# Patient Record
Sex: FEMALE
[De-identification: names, ages, dates, MRNs, and addresses within clinical notes are randomized per-mention and may not be internally consistent; named-entity substitution may affect disease eponyms.]

---

## 2021-09-10 ENCOUNTER — TRANSCRIPTION ENCOUNTER (OUTPATIENT)
Age: 31
End: 2021-09-10

## 2021-10-20 ENCOUNTER — TRANSCRIPTION ENCOUNTER (OUTPATIENT)
Age: 31
End: 2021-10-20

## 2023-02-19 ENCOUNTER — EMERGENCY (EMERGENCY)
Facility: HOSPITAL | Age: 33
LOS: 0 days | Discharge: HOME | End: 2023-02-19
Payer: COMMERCIAL

## 2023-02-19 VITALS
TEMPERATURE: 98 F | RESPIRATION RATE: 19 BRPM | HEART RATE: 78 BPM | WEIGHT: 179.9 LBS | SYSTOLIC BLOOD PRESSURE: 105 MMHG | DIASTOLIC BLOOD PRESSURE: 70 MMHG | OXYGEN SATURATION: 96 % | HEIGHT: 64 IN

## 2023-02-19 VITALS
DIASTOLIC BLOOD PRESSURE: 76 MMHG | HEART RATE: 82 BPM | TEMPERATURE: 98 F | RESPIRATION RATE: 20 BRPM | OXYGEN SATURATION: 98 % | SYSTOLIC BLOOD PRESSURE: 110 MMHG

## 2023-02-19 DIAGNOSIS — M54.2 CERVICALGIA: ICD-10-CM

## 2023-02-19 PROCEDURE — 99284 EMERGENCY DEPT VISIT MOD MDM: CPT

## 2023-02-19 RX ORDER — METHOCARBAMOL 500 MG/1
2 TABLET, FILM COATED ORAL
Qty: 30 | Refills: 0
Start: 2023-02-19 | End: 2023-02-23

## 2023-02-19 RX ORDER — KETOROLAC TROMETHAMINE 30 MG/ML
15 SYRINGE (ML) INJECTION ONCE
Refills: 0 | Status: DISCONTINUED | OUTPATIENT
Start: 2023-02-19 | End: 2023-02-19

## 2023-02-19 RX ORDER — DIAZEPAM 5 MG
5 TABLET ORAL ONCE
Refills: 0 | Status: DISCONTINUED | OUTPATIENT
Start: 2023-02-19 | End: 2023-02-19

## 2023-02-19 RX ORDER — DEXAMETHASONE 0.5 MG/5ML
10 ELIXIR ORAL ONCE
Refills: 0 | Status: COMPLETED | OUTPATIENT
Start: 2023-02-19 | End: 2023-02-19

## 2023-02-19 RX ORDER — IBUPROFEN 200 MG
1 TABLET ORAL
Qty: 20 | Refills: 0
Start: 2023-02-19 | End: 2023-02-23

## 2023-02-19 RX ORDER — ACETAMINOPHEN 500 MG
975 TABLET ORAL ONCE
Refills: 0 | Status: COMPLETED | OUTPATIENT
Start: 2023-02-19 | End: 2023-02-19

## 2023-02-19 RX ADMIN — Medication 10 MILLIGRAM(S): at 20:31

## 2023-02-19 RX ADMIN — Medication 15 MILLIGRAM(S): at 20:16

## 2023-02-19 RX ADMIN — Medication 15 MILLIGRAM(S): at 19:46

## 2023-02-19 RX ADMIN — Medication 975 MILLIGRAM(S): at 20:30

## 2023-02-19 RX ADMIN — Medication 5 MILLIGRAM(S): at 19:47

## 2023-02-19 NOTE — ED PROVIDER NOTE - PROVIDER TOKENS
FREE:[LAST:[your pmd in 1-3 days],PHONE:[(   )    -],FAX:[(   )    -]],PROVIDER:[TOKEN:[[id number]],FOLLOWUP:[1-3 Days]]

## 2023-02-19 NOTE — ED PROVIDER NOTE - PHYSICAL EXAMINATION
PHYSICAL EXAM:    GENERAL: Alert, appears stated age, well appearing, non-toxic  SKIN: Warm, pink and dry. MMM.   HEAD: NC, AT  EYE: Normal lids/conjunctiva  ENT: Normal hearing, patent oropharynx without erythema or exudate  NECK: +supple. No meningismus, or JVD, +Trachea midline. no midline tenderness/step offs. +R>L paracervical ttp. no rash.   Pulm: Bilateral BS, normal resp effort, no wheezes, stridor, or retractions  CV: RRR, no M/R/G, 2+and = radial pulses  Mskel: no erythema, cyanosis, edema. no calf tenderness. no spinal ttp.   Neuro: AAOx3, no sensory/motor deficits, 5/5 strength throughout. normal gait.

## 2023-02-19 NOTE — ED ADULT TRIAGE NOTE - CHIEF COMPLAINT QUOTE
Patient c/o an aching dull neck pain x 10 days. Denies any trauma or injury. Patient used Bengay with no relief  LMP 2/2/23  no pmh

## 2023-02-19 NOTE — ED ADULT NURSE NOTE - CHPI ED NUR SYMPTOMS NEG
no anorexia/no bladder dysfunction/no bowel dysfunction/no constipation/no difficulty bearing weight/no fatigue/no motor function loss

## 2023-02-19 NOTE — ED ADULT NURSE NOTE - OBJECTIVE STATEMENT
Pt aaox4, presents to the ed c/o 10/10 nontraumatic aching neck and bilateral shoulder pain x10 days. Pt is tearful during assessment and states she's been using bengay cream with minimal relief; she's been unable to sleep 2/2 pain the past few nights. Pt denies fever/chills, headache, blurry vision, dizziness, numbness/tingling, rash, bowel or bladder incontinence, cp, sob, weakness. Pt denies any known sick contacts. Pt denies any PMHx/PSHx. LMP 2/2/23. Respirations even and unlabored.

## 2023-02-19 NOTE — ED PROVIDER NOTE - PATIENT PORTAL LINK FT
You can access the FollowMyHealth Patient Portal offered by [facility] by registering at the following website: [email or web address]. By joining [facility] FollowMyHealth portal, you will also be able to view your health information using other applications (apps) compatible with our system.

## 2023-02-19 NOTE — ED PROVIDER NOTE - NSFOLLOWUPINSTRUCTIONS_ED_ALL_ED_FT
Cervical Strain and Sprain Rehab      Ask your health care provider which exercises are safe for you. Do exercises exactly as told by your health care provider and adjust them as directed. It is normal to feel mild stretching, pulling, tightness, or discomfort as you do these exercises. Stop right away if you feel sudden pain or your pain gets worse. Do not begin these exercises until told by your health care provider.      Stretching and range-of-motion exercises      Cervical side bending   A person sitting in a chair. Looking straight ahead, the person tilts an ear toward a shoulder until a stretch is felt.   1.Using good posture, sit on a stable chair or stand up.      2.Without moving your shoulders, slowly tilt your left / right ear to your shoulder until you feel a stretch in the opposite side neck muscles. You should be looking straight ahead.      3.Hold for __________ seconds.      4.Repeat with the other side of your neck.      Repeat __________ times. Complete this exercise __________ times a day.      Cervical rotation   A person sitting on a chair. The person turns head to one side, returns to center, and repeats on other side.   1.Using good posture, sit on a stable chair or stand up.    2.Slowly turn your head to the side as if you are looking over your left / right shoulder.  •Keep your eyes level with the ground.      •Stop when you feel a stretch along the side and the back of your neck.        3.Hold for __________ seconds.      4.Repeat this by turning to your other side.      Repeat __________ times. Complete this exercise __________ times a day.      Thoracic extension and pectoral stretch   A person lying with a rolled-up towel under the middle back. Person puts hands behind head and lets elbows fall out to sides.   1.Roll a towel or a small blanket so it is about 4 inches (10 cm) in diameter.      2.Lie down on your back on a firm surface.      3.Put the towel in the middle of your back across your spine. It should not be under your shoulder blades.      4.Put your hands behind your head and let your elbows fall out to your sides.      5.Hold for __________ seconds.      Repeat __________ times. Complete this exercise __________ times a day.      Strengthening exercises      Isometric upper cervical flexion   A person lying face-up, a pillow behind the head and a rolled-up towel under the neck. The person tucks chin toward chest.   1.Lie on your back with a thin pillow behind your head and a small rolled-up towel under your neck.      2.Gently tuck your chin toward your chest and nod your head down to look toward your feet. Do not lift your head off the pillow.      3.Hold for __________ seconds.      4.Release the tension slowly. Relax your neck muscles completely before you repeat this exercise.      Repeat __________ times. Complete this exercise __________ times a day.      Isometric cervical extension   A person standing with a ball between the back of the head and a wall. The person tilts head back and presses into ball.   1.Stand about 6 inches (15 cm) away from a wall, with your back facing the wall.      2.Place a soft object, about 6–8 inches (15–20 cm) in diameter, between the back of your head and the wall. A soft object could be a small pillow, a ball, or a folded towel.      3.Gently tilt your head back and press into the soft object. Keep your jaw and forehead relaxed.      4.Hold for __________ seconds.      5.Release the tension slowly. Relax your neck muscles completely before you repeat this exercise.      Repeat __________ times. Complete this exercise __________ times a day.      Posture and body mechanics    Body mechanics refers to the movements and positions of your body while you do your daily activities. Posture is part of body mechanics. Good posture and healthy body mechanics can help to relieve stress in your body's tissues and joints. Good posture means that your spine is in its natural S-curve position (your spine is neutral), your shoulders are pulled back slightly, and your head is not tipped forward. The following are general guidelines for applying improved posture and body mechanics to your everyday activities.      Sitting   A person showing good posture while sitting at a desk and using a computer.   1.When sitting, keep your spine neutral and keep your feet flat on the floor. Use a footrest, if necessary, and keep your thighs parallel to the floor. Avoid rounding your shoulders, and avoid tilting your head forward.      2.When working at a desk or a computer, keep your desk at a height where your hands are slightly lower than your elbows. Slide your chair under your desk so you are close enough to maintain good posture.      3.When working at a computer, place your monitor at a height where you are looking straight ahead and you do not have to tilt your head forward or downward to look at the screen.        Standing   A person standing and ironing with one foot resting on a stable footstool to help keep the spine neutral.   •When standing, keep your spine neutral and keep your feet about hip-width apart. Keep a slight bend in your knees. Your ears, shoulders, and hips should line up.      •When you do a task in which you  one place for a long time, place one foot up on a stable object that is 2–4 inches (5–10 cm) high, such as a footstool. This helps keep your spine neutral.      Resting     A person lying on his side, head and neck supported by a pillow. A dotted line runs along spine to show neutral position.When lying down and resting, avoid positions that are most painful for you. Try to support your neck in a neutral position. You can use a contour pillow or a small rolled-up towel. Your pillow should support your neck but not push on it.    This information is not intended to replace advice given to you by your health care provider. Make sure you discuss any questions you have with your health care provider.      Document Revised: 11/07/2022 Document Reviewed: 11/07/2022    Elsevier Patient Education © 2022 [employer]

## 2023-02-19 NOTE — ED PROVIDER NOTE - CARE PROVIDER_API CALL
your pmd in 1-3 days,   Phone: (   )    -  Fax: (   )    -  Follow Up Time:     Angelo Galvan (DO)  Orthopaedic Surgery Surgery  [address] [facility], [place] [address]  Phone: [phone]  Fax: [phone]  Follow Up Time: 1-3 Days

## 2023-02-19 NOTE — ED PROVIDER NOTE - OBJECTIVE STATEMENT
32-year-old female with past medical history of chronic back pain, right shoulder surgery 2018 presents with moderate constant throbbing nonradiating bilateral neck pain x 10 days, much worse with twisting head, much better with when holding head in straight position.  No known trauma, fall, nausea, vomiting, paresthesia, weakness, fever, rash.  denies difficulty ambulating, direct trauma, hx IVDA, recent injections, hx back surgeries, unexplained wt loss.

## 2023-02-19 NOTE — ED PROVIDER NOTE - CLINICAL SUMMARY MEDICAL DECISION MAKING FREE TEXT BOX
33 y/o F with atraumatic paracervical neck pain x 10 days, much worse with twisting motions, better with holding head forward.  has only tried bengay for it.   no paresthesia/weakness/change in color or pulses.   other than paracervical ttp, normal exam  has follow up with pcp.  will tx with valium (someone is driving her home) and toradol.   denies pregnancy.   Reviewed necessity for follow up. Counseled on red flags and to return for them.  Patient appears well on discharge.

## 2023-02-21 ENCOUNTER — FORM ENCOUNTER (OUTPATIENT)
Age: 33
End: 2023-02-21

## 2023-02-22 ENCOUNTER — APPOINTMENT (OUTPATIENT)
Dept: ORTHOPEDIC SURGERY | Facility: CLINIC | Age: 33
End: 2023-02-22
Payer: COMMERCIAL

## 2023-02-22 ENCOUNTER — APPOINTMENT (OUTPATIENT)
Dept: MRI IMAGING | Facility: CLINIC | Age: 33
End: 2023-02-22
Payer: COMMERCIAL

## 2023-02-22 VITALS — WEIGHT: 180 LBS | BODY MASS INDEX: 30.73 KG/M2 | HEIGHT: 64 IN

## 2023-02-22 DIAGNOSIS — M54.2 CERVICALGIA: ICD-10-CM

## 2023-02-22 DIAGNOSIS — Z78.9 OTHER SPECIFIED HEALTH STATUS: ICD-10-CM

## 2023-02-22 PROBLEM — Z00.00 ENCOUNTER FOR PREVENTIVE HEALTH EXAMINATION: Status: ACTIVE | Noted: 2023-02-22

## 2023-02-22 PROCEDURE — 72141 MRI NECK SPINE W/O DYE: CPT

## 2023-02-22 PROCEDURE — 99204 OFFICE O/P NEW MOD 45 MIN: CPT

## 2023-02-22 PROCEDURE — 72040 X-RAY EXAM NECK SPINE 2-3 VW: CPT

## 2023-02-22 NOTE — PHYSICAL EXAM
[de-identified] : 02/22/2023 rotational range to the right is 75% is full.  rotation to the left is full.  the biceps reflux 2 plus and equal the triceps are 1-2 plus equal.  Pt states that  driving  is the most panful activity.  While driving pt applies pressure with her fingertips to the mid line of the cervical spine.  X-Rays C-Spine lateral shows complete straightening of the lordosis.  There is a minimal DDD from C1 down to C6 and slight spur formation narrowing at C6-C7 no lesions are seen.  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  The view of the C1 to articulation is obscured by overlying structures.  This articulation will be evaluated by MRI.

## 2023-02-27 RX ORDER — IBUPROFEN 800 MG/1
800 TABLET, FILM COATED ORAL 3 TIMES DAILY
Qty: 60 | Refills: 0 | Status: ACTIVE | COMMUNITY
Start: 2023-02-27 | End: 1900-01-01

## 2023-03-02 ENCOUNTER — APPOINTMENT (OUTPATIENT)
Dept: ORTHOPEDIC SURGERY | Facility: CLINIC | Age: 33
End: 2023-03-02
Payer: COMMERCIAL

## 2023-03-02 VITALS — WEIGHT: 180 LBS | HEIGHT: 64 IN | BODY MASS INDEX: 30.73 KG/M2

## 2023-03-02 DIAGNOSIS — M54.2 CERVICALGIA: ICD-10-CM

## 2023-03-02 DIAGNOSIS — S13.9XXA SPRAIN OF JOINTS AND LIGAMENTS OF UNSPECIFIED PARTS OF NECK, INITIAL ENCOUNTER: ICD-10-CM

## 2023-03-02 PROCEDURE — 99214 OFFICE O/P EST MOD 30 MIN: CPT

## 2023-03-02 RX ORDER — METAXALONE 800 MG/1
800 TABLET ORAL TWICE DAILY
Qty: 60 | Refills: 0 | Status: COMPLETED | COMMUNITY
Start: 2023-03-02 | End: 2023-03-02

## 2023-03-02 RX ORDER — METHOCARBAMOL 750 MG/1
750 TABLET, FILM COATED ORAL TWICE DAILY
Qty: 40 | Refills: 1 | Status: COMPLETED | COMMUNITY
Start: 2023-02-27 | End: 2023-03-02

## 2023-03-02 RX ORDER — CYCLOBENZAPRINE HYDROCHLORIDE 5 MG/1
5 TABLET, FILM COATED ORAL 3 TIMES DAILY
Qty: 90 | Refills: 0 | Status: ACTIVE | COMMUNITY
Start: 2023-03-02 | End: 1900-01-01

## 2023-03-02 RX ORDER — METAXALONE 800 MG/1
800 TABLET ORAL TWICE DAILY
Qty: 60 | Refills: 0 | Status: COMPLETED | COMMUNITY
End: 2023-03-02

## 2023-03-02 NOTE — DISCUSSION/SUMMARY
[de-identified] : MRI of the C-spine to investigate C1-2 and the source of the radicular symptoms.  \par \par Pt will continue with activity modification and home exercise as tolerated.  The patient should avoid exercise and activity that aggravates pain. \par \par \par The patient was advised of the diagnosis.  The natural history of the pathology was explained in full to the patient in layman's terms, including but not limited to the risks, symptoms and available options for treatment.  We discussed the risks, benefits and alternatives of the treatment options and the advice I provided to the patient as listed above.  Pt was given the opportunity to ask questions, and all questions were answered.  The discussion was not limited to the above.

## 2023-03-02 NOTE — DISCUSSION/SUMMARY
[de-identified] : 1) **Rx PT \par 2) **Continue Ibuprofen 800mg 1TID PRN. The patient may combine each dose of this medication with 1 tablet of OTC Tylenol ES (500mg). \par 3) **Rx Metaxalone \par 4) D/C Methocarbamol, and do not take with Metaxalone\par 5) Pt will continue with activity modification and home exercise as tolerated.  The patient should avoid exercise and activity that aggravates pain. \par \par \par The patient will continue with conservative treatment as described above, and will F/U in 6 weeks\par \par NSAIDs- The patient was informed of the risks, benefits and alternatives of this medication, including risks to the GI tract, kidneys, liver, increased blood pressure, cardiac risk, and risk of fluid retention.  Advised to clear medication with internist or PCP if any concurrent health problem with heart, blood pressure, or GI system exists.  Do not combine NSAIDs with other NSAIDs, Mobic or MDP. The patient was instructed on the proper usage of NSAIDs. \par \par Muscle Relaxants- These medications are Rx to help decrease muscle spasms and assist with pain relief.  The patient was advised of the sedating side effects, and was instructed not to drive or operate heavy machinery while under the influence of the prescribed muscle relaxers.  The patient was advised not to combine muscle relaxers with other sedating medications.  The risks, benefits and alternatives of the muscle relaxers prescribed was discussed, and the patient was instructed on the proper usage of this medication.\par  \par The patient was advised of the diagnosis.  The natural history of the pathology was explained in full to the patient in layman's terms, including but not limited to the risks, symptoms and available options for treatment.  We discussed the risks, benefits and alternatives of the treatment options and the advice I provided to the patient as listed above.  Pt was given the opportunity to ask questions, and all questions were answered.  The discussion was not limited to the above.\par \par Entered by Joe Garcia acting as scribe.

## 2023-03-02 NOTE — DATA REVIEWED
[MRI] : MRI [Cervical Spine] : cervical spine [Report was reviewed and noted in the chart] : The report was reviewed and noted in the chart [I independently reviewed and interpreted images and report] : I independently reviewed and interpreted images and report [I reviewed the films/CD and agree] : I reviewed the films/CD and agree [FreeTextEntry1] : 2/22/23 at [facility]

## 2023-03-02 NOTE — HISTORY OF PRESENT ILLNESS
[Neck] : neck [Sudden] : sudden [10] : 10 [Sharp] : sharp [Constant] : constant [de-identified] : 02/22/2023: Neck\par This is Ms. TARIQ KNIGHT  a 32 year old female who comes in today complaining of neck pain for a while.  She says it may be from an old injury.  She went to [facility] and was given a toradol shot.   [] : no [FreeTextEntry7] : to shoulders and arms [de-identified] : ER

## 2023-03-02 NOTE — PHYSICAL EXAM
[de-identified] : 02/22/2023 rotational range to the right is 75% is full.  rotation to the left is full.  the biceps reflux 2 plus and equal the triceps are 1-2 plus equal.  Pt states that  driving  is the most panful activity.  While driving pt applies pressure with her fingertips to the mid line of the cervical spine.  X-Rays C-Spine lateral shows complete straightening of the lordosis.  There is a minimal DDD from C1 down to C6 and slight spur formation narrowing at C6-C7 no lesions are seen.  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  The view of the C1 to articulation is obscured by overlying structures.  This articulation will be evaluated by MRI.

## 2023-03-02 NOTE — IMAGING
[de-identified] : Cervical Spine Exam: Rotational range to the right is 75% is full.  rotation to the left is full.  the biceps reflux 2 plus and equal the triceps are 1-2 plus equal.  Pt states that  driving  is the most panful activity.  While driving pt applies pressure with her fingertips to the mid line of the cervical spine. \par \par X-ray of the cervical spine/ neck (3 views) on 03/02/2023: Complete straightening of the lordosis.  There is a minimal DDD from C1 down to C6 and slight spur formation narrowing at C6-C7 no lesions are seen.  Facet arthrosis is slightly present form C6 to T1.  The view of the C1 to articulation is obscured by overlying structures.

## 2023-03-02 NOTE — IMAGING
[de-identified] : 02/22/2023 rotational range to the right is 75% is full.  rotation to the left is full.  the biceps reflux 2 plus and equal the triceps are 1-2 plus equal.  Pt states that  driving  is the most panful activity.  While driving pt applies pressure with her fingertips to the mid line of the cervical spine.  X-Rays C-Spine lateral shows complete straightening of the lordosis.  There is a minimal DDD from C1 down to C6 and slight spur formation narrowing at C6-C7 no lesions are seen.  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  The view of the C1 to articulation is obscured by overlying structures.  This articulation will be evaluated by MRI.

## 2023-03-07 ENCOUNTER — APPOINTMENT (OUTPATIENT)
Dept: ORTHOPEDIC SURGERY | Facility: CLINIC | Age: 33
End: 2023-03-07

## 2023-04-14 ENCOUNTER — APPOINTMENT (OUTPATIENT)
Dept: ORTHOPEDIC SURGERY | Facility: CLINIC | Age: 33
End: 2023-04-14